# Patient Record
Sex: MALE | Race: BLACK OR AFRICAN AMERICAN | NOT HISPANIC OR LATINO
[De-identification: names, ages, dates, MRNs, and addresses within clinical notes are randomized per-mention and may not be internally consistent; named-entity substitution may affect disease eponyms.]

---

## 2021-10-09 ENCOUNTER — TRANSCRIPTION ENCOUNTER (OUTPATIENT)
Age: 51
End: 2021-10-09

## 2021-11-09 ENCOUNTER — TRANSCRIPTION ENCOUNTER (OUTPATIENT)
Age: 51
End: 2021-11-09

## 2022-09-19 ENCOUNTER — APPOINTMENT (OUTPATIENT)
Dept: UROLOGY | Facility: CLINIC | Age: 52
End: 2022-09-19

## 2022-09-19 VITALS
HEART RATE: 80 BPM | SYSTOLIC BLOOD PRESSURE: 119 MMHG | BODY MASS INDEX: 42.95 KG/M2 | OXYGEN SATURATION: 95 % | DIASTOLIC BLOOD PRESSURE: 76 MMHG | WEIGHT: 290 LBS | TEMPERATURE: 97.5 F | HEIGHT: 69 IN | RESPIRATION RATE: 17 BRPM

## 2022-09-19 PROCEDURE — 99204 OFFICE O/P NEW MOD 45 MIN: CPT

## 2022-09-21 LAB
ALBUMIN SERPL ELPH-MCNC: 4.5 G/DL
ALP BLD-CCNC: 90 U/L
ALT SERPL-CCNC: 11 U/L
ANION GAP SERPL CALC-SCNC: 11 MMOL/L
APPEARANCE: ABNORMAL
AST SERPL-CCNC: 12 U/L
BACTERIA UR CULT: NORMAL
BACTERIA: NEGATIVE
BILIRUB SERPL-MCNC: 0.4 MG/DL
BILIRUBIN URINE: NEGATIVE
BLOOD URINE: NEGATIVE
BUN SERPL-MCNC: 16 MG/DL
CALCIUM OXALATE CRYSTALS: ABNORMAL
CALCIUM SERPL-MCNC: 10.3 MG/DL
CHLORIDE SERPL-SCNC: 104 MMOL/L
CO2 SERPL-SCNC: 26 MMOL/L
COLOR: YELLOW
CREAT SERPL-MCNC: 1.07 MG/DL
EGFR: 83 ML/MIN/1.73M2
GLUCOSE QUALITATIVE U: ABNORMAL
GLUCOSE SERPL-MCNC: 166 MG/DL
HYALINE CASTS: 1 /LPF
KETONES URINE: NEGATIVE
LEUKOCYTE ESTERASE URINE: NEGATIVE
MICROSCOPIC-UA: NORMAL
NITRITE URINE: NEGATIVE
PH URINE: 5.5
POTASSIUM SERPL-SCNC: 4.4 MMOL/L
PROT SERPL-MCNC: 7.2 G/DL
PROTEIN URINE: NORMAL
PSA FREE FLD-MCNC: 23 %
PSA FREE SERPL-MCNC: 1.28 NG/ML
PSA SERPL-MCNC: 5.63 NG/ML
RED BLOOD CELLS URINE: 0 /HPF
SODIUM SERPL-SCNC: 141 MMOL/L
SPECIFIC GRAVITY URINE: 1.03
SQUAMOUS EPITHELIAL CELLS: 0 /HPF
UROBILINOGEN URINE: NORMAL
WHITE BLOOD CELLS URINE: 1 /HPF

## 2022-09-21 RX ORDER — ATORVASTATIN CALCIUM 40 MG/1
40 TABLET, FILM COATED ORAL
Qty: 90 | Refills: 0 | Status: ACTIVE | COMMUNITY
Start: 2022-09-08

## 2022-09-21 RX ORDER — SITAGLIPTIN 100 MG/1
100 TABLET, FILM COATED ORAL
Qty: 90 | Refills: 0 | Status: ACTIVE | COMMUNITY
Start: 2022-09-08

## 2022-09-21 RX ORDER — METFORMIN HYDROCHLORIDE 1000 MG/1
1000 TABLET, COATED ORAL
Qty: 90 | Refills: 0 | Status: ACTIVE | COMMUNITY
Start: 2022-09-08

## 2022-09-21 RX ORDER — HYDROCHLOROTHIAZIDE 25 MG/1
25 TABLET ORAL
Qty: 90 | Refills: 0 | Status: ACTIVE | COMMUNITY
Start: 2022-09-08

## 2022-09-23 ENCOUNTER — APPOINTMENT (OUTPATIENT)
Dept: MRI IMAGING | Facility: IMAGING CENTER | Age: 52
End: 2022-09-23

## 2022-09-23 ENCOUNTER — RESULT REVIEW (OUTPATIENT)
Age: 52
End: 2022-09-23

## 2022-09-23 ENCOUNTER — OUTPATIENT (OUTPATIENT)
Dept: OUTPATIENT SERVICES | Facility: HOSPITAL | Age: 52
LOS: 1 days | End: 2022-09-23
Payer: COMMERCIAL

## 2022-09-23 DIAGNOSIS — R97.20 ELEVATED PROSTATE SPECIFIC ANTIGEN [PSA]: ICD-10-CM

## 2022-09-23 PROCEDURE — 76498P: CUSTOM | Mod: 26

## 2022-09-23 PROCEDURE — 72197 MRI PELVIS W/O & W/DYE: CPT

## 2022-09-23 PROCEDURE — 72197 MRI PELVIS W/O & W/DYE: CPT | Mod: 26

## 2022-09-23 PROCEDURE — A9585: CPT

## 2022-09-23 PROCEDURE — 76498 UNLISTED MR PROCEDURE: CPT

## 2022-10-03 ENCOUNTER — NON-APPOINTMENT (OUTPATIENT)
Age: 52
End: 2022-10-03

## 2022-10-03 ENCOUNTER — APPOINTMENT (OUTPATIENT)
Dept: UROLOGY | Facility: CLINIC | Age: 52
End: 2022-10-03

## 2022-10-03 VITALS
HEART RATE: 81 BPM | BODY MASS INDEX: 28.14 KG/M2 | SYSTOLIC BLOOD PRESSURE: 135 MMHG | HEIGHT: 69 IN | RESPIRATION RATE: 16 BRPM | WEIGHT: 190 LBS | DIASTOLIC BLOOD PRESSURE: 88 MMHG

## 2022-10-03 PROCEDURE — 99213 OFFICE O/P EST LOW 20 MIN: CPT

## 2022-10-03 RX ORDER — SODIUM PHOSPHATE, DIBASIC AND SODIUM PHOSPHATE, MONOBASIC 7; 19 G/230ML; G/230ML
ENEMA RECTAL
Qty: 1 | Refills: 0 | Status: ACTIVE | COMMUNITY
Start: 2022-10-03 | End: 1900-01-01

## 2022-10-12 ENCOUNTER — NON-APPOINTMENT (OUTPATIENT)
Age: 52
End: 2022-10-12

## 2022-11-06 DIAGNOSIS — Z00.00 ENCOUNTER FOR GENERAL ADULT MEDICAL EXAMINATION W/OUT ABNORMAL FINDINGS: ICD-10-CM

## 2022-11-07 ENCOUNTER — OUTPATIENT (OUTPATIENT)
Dept: OUTPATIENT SERVICES | Facility: HOSPITAL | Age: 52
LOS: 1 days | End: 2022-11-07
Payer: COMMERCIAL

## 2022-11-07 ENCOUNTER — APPOINTMENT (OUTPATIENT)
Dept: UROLOGY | Facility: CLINIC | Age: 52
End: 2022-11-07

## 2022-11-07 VITALS — HEART RATE: 87 BPM | DIASTOLIC BLOOD PRESSURE: 109 MMHG | SYSTOLIC BLOOD PRESSURE: 165 MMHG

## 2022-11-07 DIAGNOSIS — R35.0 FREQUENCY OF MICTURITION: ICD-10-CM

## 2022-11-07 PROCEDURE — 76872 US TRANSRECTAL: CPT | Mod: 26

## 2022-11-07 PROCEDURE — 76872 US TRANSRECTAL: CPT

## 2022-11-07 PROCEDURE — 99213 OFFICE O/P EST LOW 20 MIN: CPT | Mod: 25

## 2022-11-07 NOTE — LETTER BODY
[FreeTextEntry1] : Cheikh Pleitez NP\par [address],\[place] [address]\par [place]\par [phone]\par \par Dear Nurse Pricilla,\par \par Reason for visit: Elevated PSA. Prostate biopsy.\par \par This is a 52 year-old gentleman with an elevated PSA. His PSA is 5.63. The patient is here today for transperineal fusion biopsy of the prostate to evaluate for the presence of clinically significant prostate cancer. The patient was brought into the procedure room. It was confirmed that he has not been on any blood thinners including aspirin and ibuprofen for the last week, unless advised otherwise.\par \par The patient signed a written informed consent. The risks and benefits and expected outcomes were discussed. The patient was counseled. The patient took the necessary preparations for the procedure.\par \par Prostate ultrasound was performed.  Prostate volume is approximate 50 cc.\par \par   As the prostate and pelvic floor musculature was anesthetized, patient was unable to tolerate transperineal prostate biopsy. The patient requests the procedure be discontinued and performed under anesthesia.\par \par Assessment: Elevated PSA. Unable to tolerate prostate biopsy.\par \par I counseled the patient. The patient requested transperineal prostate biopsy under anesthesia. I counseled the patient regarding the procedure. The risks and benefits were discussed. Alternatives were given. I answered the patient questions. The patient will take the necessary preparations for the procedure, including repeating preoperative labs. Thank you for the opportunity to participate in the care of this patient, I will keep you updated on his progress.\par \par Plan: Transperineal fusion biopsy under anesthesia. Preop labs. Follow up as directed. \par \par I spent 30-minutes time today on all issues related to this patient on today date of service including non face to face time.

## 2022-11-07 NOTE — ADDENDUM
[FreeTextEntry1] : Entered by ALLAN ROCHA, acting as scribe for Dr. Rakesh Mejia.\par The documentation recorded by the scribe accurately reflects the service I personally performed and the decisions made by me.

## 2022-11-07 NOTE — HISTORY OF PRESENT ILLNESS
[FreeTextEntry1] : Patient was unable to tolerate transperineal prostate biopsy.\par \par Patient requests procedure under anesthesia.\par \par Plan: Transperineal fusion biopsy under anesthesia.\par \par Please refer to URO Consult note

## 2022-11-08 DIAGNOSIS — R97.20 ELEVATED PROSTATE SPECIFIC ANTIGEN [PSA]: ICD-10-CM

## 2022-11-08 DIAGNOSIS — E11.9 TYPE 2 DIABETES MELLITUS WITHOUT COMPLICATIONS: ICD-10-CM

## 2022-11-09 ENCOUNTER — OUTPATIENT (OUTPATIENT)
Dept: OUTPATIENT SERVICES | Facility: HOSPITAL | Age: 52
LOS: 1 days | End: 2022-11-09
Payer: SELF-PAY

## 2022-11-09 ENCOUNTER — OUTPATIENT (OUTPATIENT)
Dept: OUTPATIENT SERVICES | Facility: HOSPITAL | Age: 52
LOS: 1 days | End: 2022-11-09

## 2022-11-09 VITALS
TEMPERATURE: 97 F | OXYGEN SATURATION: 98 % | DIASTOLIC BLOOD PRESSURE: 73 MMHG | WEIGHT: 190.04 LBS | HEIGHT: 68 IN | HEART RATE: 95 BPM | RESPIRATION RATE: 14 BRPM | SYSTOLIC BLOOD PRESSURE: 132 MMHG

## 2022-11-09 DIAGNOSIS — Z00.8 ENCOUNTER FOR OTHER GENERAL EXAMINATION: ICD-10-CM

## 2022-11-09 DIAGNOSIS — E11.9 TYPE 2 DIABETES MELLITUS WITHOUT COMPLICATIONS: ICD-10-CM

## 2022-11-09 DIAGNOSIS — R97.20 ELEVATED PROSTATE SPECIFIC ANTIGEN [PSA]: ICD-10-CM

## 2022-11-09 LAB
A1C WITH ESTIMATED AVERAGE GLUCOSE RESULT: 7.4 % — HIGH (ref 4–5.6)
ANION GAP SERPL CALC-SCNC: 13 MMOL/L — SIGNIFICANT CHANGE UP (ref 7–14)
BUN SERPL-MCNC: 18 MG/DL — SIGNIFICANT CHANGE UP (ref 7–23)
CALCIUM SERPL-MCNC: 9.5 MG/DL — SIGNIFICANT CHANGE UP (ref 8.4–10.5)
CHLORIDE SERPL-SCNC: 100 MMOL/L — SIGNIFICANT CHANGE UP (ref 98–107)
CO2 SERPL-SCNC: 27 MMOL/L — SIGNIFICANT CHANGE UP (ref 22–31)
CREAT SERPL-MCNC: 1.02 MG/DL — SIGNIFICANT CHANGE UP (ref 0.5–1.3)
EGFR: 88 ML/MIN/1.73M2 — SIGNIFICANT CHANGE UP
ESTIMATED AVERAGE GLUCOSE: 166 — SIGNIFICANT CHANGE UP
GLUCOSE SERPL-MCNC: 155 MG/DL — HIGH (ref 70–99)
HCT VFR BLD CALC: 41.7 % — SIGNIFICANT CHANGE UP (ref 39–50)
HGB BLD-MCNC: 13.8 G/DL — SIGNIFICANT CHANGE UP (ref 13–17)
MCHC RBC-ENTMCNC: 28.7 PG — SIGNIFICANT CHANGE UP (ref 27–34)
MCHC RBC-ENTMCNC: 33.1 GM/DL — SIGNIFICANT CHANGE UP (ref 32–36)
MCV RBC AUTO: 86.7 FL — SIGNIFICANT CHANGE UP (ref 80–100)
NRBC # BLD: 0 /100 WBCS — SIGNIFICANT CHANGE UP (ref 0–0)
NRBC # FLD: 0 K/UL — SIGNIFICANT CHANGE UP (ref 0–0)
PLATELET # BLD AUTO: 207 K/UL — SIGNIFICANT CHANGE UP (ref 150–400)
POTASSIUM SERPL-MCNC: 4 MMOL/L — SIGNIFICANT CHANGE UP (ref 3.5–5.3)
POTASSIUM SERPL-SCNC: 4 MMOL/L — SIGNIFICANT CHANGE UP (ref 3.5–5.3)
RBC # BLD: 4.81 M/UL — SIGNIFICANT CHANGE UP (ref 4.2–5.8)
RBC # FLD: 13.7 % — SIGNIFICANT CHANGE UP (ref 10.3–14.5)
SODIUM SERPL-SCNC: 140 MMOL/L — SIGNIFICANT CHANGE UP (ref 135–145)
WBC # BLD: 4.79 K/UL — SIGNIFICANT CHANGE UP (ref 3.8–10.5)
WBC # FLD AUTO: 4.79 K/UL — SIGNIFICANT CHANGE UP (ref 3.8–10.5)

## 2022-11-09 PROCEDURE — C8001: CPT

## 2022-11-09 PROCEDURE — 93010 ELECTROCARDIOGRAM REPORT: CPT

## 2022-11-09 RX ORDER — SITAGLIPTIN 50 MG/1
1 TABLET, FILM COATED ORAL
Qty: 0 | Refills: 0 | DISCHARGE

## 2022-11-09 NOTE — H&P PST ADULT - GASTROINTESTINAL
normal/soft/nontender/nondistended/normal active bowel sounds/no guarding/no rigidity/no organomegaly/no palpable lv/no masses palpable negative

## 2022-11-09 NOTE — H&P PST ADULT - PROBLEM SELECTOR PLAN 1
Pt scheduled for US guidance prostate biopsy on 11/14/2022.  labs done results pending, ekg done.  Pt instructed to obtain preop covid testing.  Preop teaching done, pt able to verbalize understanding.   medications day of procedure- pepcid   dm-  pt instructed not to take dm medications day of procedure  anesthesia- CHRISTOS precautions

## 2022-11-09 NOTE — H&P PST ADULT - NSANTHOSAYNRD_GEN_A_CORE
No. CHRISTOS screening performed.  STOP BANG Legend: 0-2 = LOW Risk; 3-4 = INTERMEDIATE Risk; 5-8 = HIGH Risk

## 2022-11-09 NOTE — H&P PST ADULT - NEGATIVE GENERAL SYMPTOMS
no fever/no chills/no sweating/no anorexia/no weight loss/no weight gain/no polyphagia/no polydipsia/no malaise/no fatigue no fever/no chills/no sweating/no anorexia/no weight loss/no weight gain/no polyphagia/no polyuria/no polydipsia/no malaise/no fatigue

## 2022-11-09 NOTE — H&P PST ADULT - HISTORY OF PRESENT ILLNESS
51y/o male scheduled for US guidance prostate biopsy on 11/14/2022,  Pt states, "elevated prostate levels, MRI shows 4 lesions."

## 2022-11-10 LAB
CULTURE RESULTS: SIGNIFICANT CHANGE UP
SPECIMEN SOURCE: SIGNIFICANT CHANGE UP

## 2022-11-11 ENCOUNTER — NON-APPOINTMENT (OUTPATIENT)
Age: 52
End: 2022-11-11

## 2022-11-13 ENCOUNTER — TRANSCRIPTION ENCOUNTER (OUTPATIENT)
Age: 52
End: 2022-11-13

## 2022-11-14 ENCOUNTER — APPOINTMENT (OUTPATIENT)
Dept: UROLOGY | Facility: AMBULATORY SURGERY CENTER | Age: 52
End: 2022-11-14

## 2022-11-14 ENCOUNTER — TRANSCRIPTION ENCOUNTER (OUTPATIENT)
Age: 52
End: 2022-11-14

## 2022-11-14 ENCOUNTER — RESULT REVIEW (OUTPATIENT)
Age: 52
End: 2022-11-14

## 2022-11-14 ENCOUNTER — OUTPATIENT (OUTPATIENT)
Dept: OUTPATIENT SERVICES | Facility: HOSPITAL | Age: 52
LOS: 1 days | Discharge: HOME | End: 2022-11-14

## 2022-11-14 VITALS
WEIGHT: 190.04 LBS | OXYGEN SATURATION: 99 % | RESPIRATION RATE: 19 BRPM | TEMPERATURE: 98 F | SYSTOLIC BLOOD PRESSURE: 145 MMHG | HEIGHT: 68 IN | HEART RATE: 92 BPM | DIASTOLIC BLOOD PRESSURE: 89 MMHG

## 2022-11-14 VITALS
RESPIRATION RATE: 14 BRPM | HEART RATE: 72 BPM | TEMPERATURE: 98 F | SYSTOLIC BLOOD PRESSURE: 111 MMHG | OXYGEN SATURATION: 96 % | DIASTOLIC BLOOD PRESSURE: 76 MMHG

## 2022-11-14 DIAGNOSIS — E11.9 TYPE 2 DIABETES MELLITUS WITHOUT COMPLICATIONS: ICD-10-CM

## 2022-11-14 PROBLEM — R97.20 ELEVATED PROSTATE SPECIFIC ANTIGEN [PSA]: Chronic | Status: ACTIVE | Noted: 2022-11-09

## 2022-11-14 PROBLEM — I10 ESSENTIAL (PRIMARY) HYPERTENSION: Chronic | Status: ACTIVE | Noted: 2022-11-09

## 2022-11-14 LAB — GLUCOSE BLDC GLUCOMTR-MCNC: 128 MG/DL — HIGH (ref 70–99)

## 2022-11-14 PROCEDURE — 55700: CPT

## 2022-11-14 PROCEDURE — 76377 3D RENDER W/INTRP POSTPROCES: CPT | Mod: 26

## 2022-11-14 PROCEDURE — G0416: CPT | Mod: 26

## 2022-11-14 PROCEDURE — 76872 US TRANSRECTAL: CPT | Mod: 26

## 2022-11-14 RX ORDER — SODIUM CHLORIDE 9 MG/ML
1000 INJECTION, SOLUTION INTRAVENOUS
Refills: 0 | Status: DISCONTINUED | OUTPATIENT
Start: 2022-11-14 | End: 2022-11-29

## 2022-11-14 RX ORDER — METFORMIN HYDROCHLORIDE 850 MG/1
1 TABLET ORAL
Qty: 0 | Refills: 0 | DISCHARGE

## 2022-11-14 RX ORDER — SITAGLIPTIN 50 MG/1
1 TABLET, FILM COATED ORAL
Qty: 0 | Refills: 0 | DISCHARGE

## 2022-11-14 NOTE — ASU DISCHARGE PLAN (ADULT/PEDIATRIC) - NS MD DC FALL RISK RISK
For information on Fall & Injury Prevention, visit: [email or web address] OR  [email or web address] OR  https://www.cdc.gov/steadi/patient.html

## 2022-11-14 NOTE — ASU DISCHARGE PLAN (ADULT/PEDIATRIC) - CALL YOUR DOCTOR IF YOU HAVE ANY OF THE FOLLOWING:
Fever greater than (need to indicate Fahrenheit or Celsius) Bleeding that does not stop/Pain not relieved by Medications/Fever greater than (need to indicate Fahrenheit or Celsius)/Wound/Surgical Site with redness, or foul smelling discharge or pus/Nausea and vomiting that does not stop/Inability to tolerate liquids or foods

## 2022-11-14 NOTE — ASU DISCHARGE PLAN (ADULT/PEDIATRIC) - CARE PROVIDER_API CALL
Rakesh Mejia)  Urology  [address] [place] [address], [place] [address]  Phone: [phone]  Fax: [phone]  Follow Up Time: 1 week

## 2022-11-18 LAB — SURGICAL PATHOLOGY STUDY: SIGNIFICANT CHANGE UP

## 2023-03-16 ENCOUNTER — APPOINTMENT (OUTPATIENT)
Dept: UROLOGY | Facility: CLINIC | Age: 53
End: 2023-03-16
Payer: COMMERCIAL

## 2023-03-16 VITALS — DIASTOLIC BLOOD PRESSURE: 75 MMHG | HEART RATE: 111 BPM | SYSTOLIC BLOOD PRESSURE: 125 MMHG

## 2023-03-16 DIAGNOSIS — R97.20 ELEVATED PROSTATE, SPECIFIC ANTIGEN [PSA]: ICD-10-CM

## 2023-03-16 DIAGNOSIS — N52.9 MALE ERECTILE DYSFUNCTION, UNSPECIFIED: ICD-10-CM

## 2023-03-16 DIAGNOSIS — E11.9 TYPE 2 DIABETES MELLITUS W/OUT COMPLICATIONS: ICD-10-CM

## 2023-03-16 DIAGNOSIS — R30.0 DYSURIA: ICD-10-CM

## 2023-03-16 PROCEDURE — 99214 OFFICE O/P EST MOD 30 MIN: CPT

## 2023-03-16 RX ORDER — TADALAFIL 20 MG/1
20 TABLET ORAL
Qty: 10 | Refills: 2 | Status: ACTIVE | COMMUNITY
Start: 2023-03-16 | End: 1900-01-01

## 2023-03-17 LAB
ESTRADIOL SERPL-MCNC: 20 PG/ML
LH SERPL-ACNC: 5 IU/L
PROLACTIN SERPL-MCNC: 5.5 NG/ML
TESTOST SERPL-MCNC: 399 NG/DL

## 2023-06-07 ENCOUNTER — NON-APPOINTMENT (OUTPATIENT)
Age: 53
End: 2023-06-07

## 2023-12-20 ENCOUNTER — APPOINTMENT (OUTPATIENT)
Dept: CARDIOLOGY | Facility: CLINIC | Age: 53
End: 2023-12-20
Payer: COMMERCIAL

## 2023-12-20 ENCOUNTER — NON-APPOINTMENT (OUTPATIENT)
Age: 53
End: 2023-12-20

## 2023-12-20 VITALS
TEMPERATURE: 95.2 F | WEIGHT: 192 LBS | BODY MASS INDEX: 28.44 KG/M2 | DIASTOLIC BLOOD PRESSURE: 74 MMHG | HEIGHT: 69 IN | RESPIRATION RATE: 19 BRPM | HEART RATE: 78 BPM | OXYGEN SATURATION: 98 % | SYSTOLIC BLOOD PRESSURE: 112 MMHG

## 2023-12-20 DIAGNOSIS — R00.2 PALPITATIONS: ICD-10-CM

## 2023-12-20 DIAGNOSIS — R06.09 OTHER FORMS OF DYSPNEA: ICD-10-CM

## 2023-12-20 PROCEDURE — 93015 CV STRESS TEST SUPVJ I&R: CPT

## 2023-12-20 PROCEDURE — 93306 TTE W/DOPPLER COMPLETE: CPT

## 2023-12-20 PROCEDURE — 93000 ELECTROCARDIOGRAM COMPLETE: CPT | Mod: 59

## 2023-12-20 PROCEDURE — 99204 OFFICE O/P NEW MOD 45 MIN: CPT | Mod: 25

## 2023-12-20 RX ORDER — EMPAGLIFLOZIN, LINAGLIPTIN, METFORMIN HYDROCHLORIDE 25; 5; 1000 MG/1; MG/1; MG/1
TABLET, EXTENDED RELEASE ORAL
Refills: 0 | Status: ACTIVE | COMMUNITY

## 2023-12-20 RX ORDER — AMLODIPINE AND OLMESARTAN MEDOXOMIL 5; 20 MG/1; MG/1
5-20 TABLET ORAL
Refills: 0 | Status: ACTIVE | COMMUNITY

## 2023-12-20 NOTE — REASON FOR VISIT
[FreeTextEntry1] : Initial cardiology office visit for evaluation and management of palpitations and dyspnea with exertion.

## 2023-12-20 NOTE — HISTORY OF PRESENT ILLNESS
[FreeTextEntry1] : The patient is accompanied today to the office by his wife. He presents as a self-referral due to concern for palpitations and dyspnea with exertion. Wife states that over the past couple weeks, she has been noting that his pulse is sometimes high.  She notes that pulse gets up to 120 and is regular and occurs when he is resting oftentimes when he awakens from sleep. He is aware that maybe his heart is beating fast but after relaxing, the symptoms do kalli. He also notes complaints of dyspnea after moderate to heavy activities. No complaints of resting shortness of breath. No chest pain or chest pressure.  No dizziness, lightheadedness, syncope or near syncope.  No edema, no orthopnea.  No PND.  Past history: Denies any prior cardiac history.  No history of CAD, MI, CHF, arrhythmias or heart murmurs. He does have diabetes for the past 15 years. Hypertension and hyperlipidemia.  He was hospitalized last year for pneumonia for about 7 days.  He does not know the complete details of that hospitalization. Current medications: Trijardy, amlodipine/olmesartan 5 mg / 20 mg daily, atorvastatin 40 mg daily  Allergies: None. Social history: Non-smoker.  No alcohol.  He works as a [place] .  .  They have a blended family, 6 children. Family history: Negative for heart disease.

## 2023-12-20 NOTE — DISCUSSION/SUMMARY
[FreeTextEntry1] : 53-year-old man presents as a self-referral for cardiology evaluation due to concern for intermittent episodes of palpitations and complaints of dyspnea after moderate to heavy activities. No prior cardiac history. Medical issues include diabetes and hypertension. Blood pressure is stable on today's examination.  There is no JVD.  Lung fields are clear.  No edema.  He is euvolemic.  No significant cardiac murmurs rubs or gallops are noted. EKG is sinus rhythm, within normal limits. Exercise stress test done today in the office.  He has a good exercise performance without complaints of chest pain or chest pressure.  Normal hemodynamic and EKG response to exercise. Echocardiogram done today in the office is favorable.  Normal left ventricular systolic function without significant valvular or pericardial disease. The favorable results of above noninvasive cardiac testing reviewed with the patient and with his wife. Limitations of noninvasive cardiac testing also discussed. The current cardiac status appears to be stable.  Plan 1.  He has been reassured regarding his current cardiac status. 2.  Current medications are reviewed, no changes. 3.  Advised to maintain an active aerobic lifestyle, and appropriate diet was also discussed. 4.  He will continue follow-up with primary care provider for his overall medical care and will follow-up with me in the office as needed. 5.  The above was reviewed with the patient with his wife, all questions answered.

## 2024-01-25 ENCOUNTER — NON-APPOINTMENT (OUTPATIENT)
Age: 54
End: 2024-01-25

## (undated) DEVICE — GLV 8 PROTEXIS (WHITE)

## (undated) DEVICE — VENODYNE/SCD SLEEVE CALF LARGE

## (undated) DEVICE — DRAPE C ARM BAND BAG

## (undated) DEVICE — Device

## (undated) DEVICE — BALLOON ENDOCAVITY 2X14CM

## (undated) DEVICE — DRSG TELFA 3 X 8

## (undated) DEVICE — GOWN XXXL

## (undated) DEVICE — BASIN SET DOUBLE

## (undated) DEVICE — NDL MAX CORE 18G X 25CM

## (undated) DEVICE — POSITIONER FOAM EGG CRATE ULNAR 2PCS (PINK)

## (undated) DEVICE — SUT ETHILON 2-0 30" KS

## (undated) DEVICE — WARMING BLANKET UPPER ADULT

## (undated) DEVICE — SYR LUER LOK 20CC

## (undated) DEVICE — GRID BRACHYTHERAPY EZ 18G